# Patient Record
Sex: MALE | Race: WHITE | NOT HISPANIC OR LATINO | Employment: FULL TIME | URBAN - METROPOLITAN AREA
[De-identification: names, ages, dates, MRNs, and addresses within clinical notes are randomized per-mention and may not be internally consistent; named-entity substitution may affect disease eponyms.]

---

## 2023-05-08 ENCOUNTER — APPOINTMENT (OUTPATIENT)
Dept: PRIMARY CARE | Facility: CLINIC | Age: 38
End: 2023-05-08
Payer: COMMERCIAL

## 2023-05-12 ENCOUNTER — OFFICE VISIT (OUTPATIENT)
Dept: PRIMARY CARE | Facility: CLINIC | Age: 38
End: 2023-05-12
Payer: COMMERCIAL

## 2023-05-12 VITALS
BODY MASS INDEX: 31.72 KG/M2 | HEART RATE: 87 BPM | DIASTOLIC BLOOD PRESSURE: 66 MMHG | SYSTOLIC BLOOD PRESSURE: 116 MMHG | HEIGHT: 71 IN | WEIGHT: 226.6 LBS | OXYGEN SATURATION: 98 %

## 2023-05-12 DIAGNOSIS — Z13.6 ENCOUNTER FOR SCREENING FOR CARDIOVASCULAR DISORDERS: ICD-10-CM

## 2023-05-12 DIAGNOSIS — Z13.29 SCREENING FOR THYROID DISORDER: ICD-10-CM

## 2023-05-12 DIAGNOSIS — Z13.0 SCREENING FOR DEFICIENCY ANEMIA: ICD-10-CM

## 2023-05-12 DIAGNOSIS — Z00.00 ROUTINE GENERAL MEDICAL EXAMINATION AT A HEALTH CARE FACILITY: Primary | ICD-10-CM

## 2023-05-12 PROBLEM — R10.9 FLANK PAIN: Status: ACTIVE | Noted: 2023-05-12

## 2023-05-12 PROBLEM — L64.9 MALE PATTERN BALDNESS: Status: ACTIVE | Noted: 2023-05-12

## 2023-05-12 LAB
ALANINE AMINOTRANSFERASE (SGPT) (U/L) IN SER/PLAS: 39 U/L (ref 10–52)
ALBUMIN (G/DL) IN SER/PLAS: 4.7 G/DL (ref 3.4–5)
ALKALINE PHOSPHATASE (U/L) IN SER/PLAS: 56 U/L (ref 33–120)
ANION GAP IN SER/PLAS: 13 MMOL/L (ref 10–20)
ASPARTATE AMINOTRANSFERASE (SGOT) (U/L) IN SER/PLAS: 20 U/L (ref 9–39)
BILIRUBIN TOTAL (MG/DL) IN SER/PLAS: 0.5 MG/DL (ref 0–1.2)
CALCIUM (MG/DL) IN SER/PLAS: 9.9 MG/DL (ref 8.6–10.6)
CARBON DIOXIDE, TOTAL (MMOL/L) IN SER/PLAS: 28 MMOL/L (ref 21–32)
CHLORIDE (MMOL/L) IN SER/PLAS: 104 MMOL/L (ref 98–107)
CHOLESTEROL (MG/DL) IN SER/PLAS: 268 MG/DL (ref 0–199)
CHOLESTEROL IN HDL (MG/DL) IN SER/PLAS: 32.5 MG/DL
CHOLESTEROL/HDL RATIO: 8.2
CREATININE (MG/DL) IN SER/PLAS: 0.71 MG/DL (ref 0.5–1.3)
ERYTHROCYTE DISTRIBUTION WIDTH (RATIO) BY AUTOMATED COUNT: 12.3 % (ref 11.5–14.5)
ERYTHROCYTE MEAN CORPUSCULAR HEMOGLOBIN CONCENTRATION (G/DL) BY AUTOMATED: 32.7 G/DL (ref 32–36)
ERYTHROCYTE MEAN CORPUSCULAR VOLUME (FL) BY AUTOMATED COUNT: 94 FL (ref 80–100)
ERYTHROCYTES (10*6/UL) IN BLOOD BY AUTOMATED COUNT: 4.8 X10E12/L (ref 4.5–5.9)
ESTIMATED AVERAGE GLUCOSE FOR HBA1C: 80 MG/DL
GFR MALE: >90 ML/MIN/1.73M2
GLUCOSE (MG/DL) IN SER/PLAS: 85 MG/DL (ref 74–99)
HEMATOCRIT (%) IN BLOOD BY AUTOMATED COUNT: 44.9 % (ref 41–52)
HEMOGLOBIN (G/DL) IN BLOOD: 14.7 G/DL (ref 13.5–17.5)
HEMOGLOBIN A1C/HEMOGLOBIN TOTAL IN BLOOD: 4.4 %
LDL: 199 MG/DL (ref 0–99)
LEUKOCYTES (10*3/UL) IN BLOOD BY AUTOMATED COUNT: 4.7 X10E9/L (ref 4.4–11.3)
NRBC (PER 100 WBCS) BY AUTOMATED COUNT: 0 /100 WBC (ref 0–0)
PLATELETS (10*3/UL) IN BLOOD AUTOMATED COUNT: 285 X10E9/L (ref 150–450)
POTASSIUM (MMOL/L) IN SER/PLAS: 4.3 MMOL/L (ref 3.5–5.3)
PROTEIN TOTAL: 7 G/DL (ref 6.4–8.2)
SODIUM (MMOL/L) IN SER/PLAS: 141 MMOL/L (ref 136–145)
THYROTROPIN (MIU/L) IN SER/PLAS BY DETECTION LIMIT <= 0.05 MIU/L: 1.41 MIU/L (ref 0.44–3.98)
TRIGLYCERIDE (MG/DL) IN SER/PLAS: 185 MG/DL (ref 0–149)
UREA NITROGEN (MG/DL) IN SER/PLAS: 14 MG/DL (ref 6–23)
VLDL: 37 MG/DL (ref 0–40)

## 2023-05-12 PROCEDURE — 84443 ASSAY THYROID STIM HORMONE: CPT

## 2023-05-12 PROCEDURE — 83036 HEMOGLOBIN GLYCOSYLATED A1C: CPT

## 2023-05-12 PROCEDURE — 99395 PREV VISIT EST AGE 18-39: CPT | Performed by: STUDENT IN AN ORGANIZED HEALTH CARE EDUCATION/TRAINING PROGRAM

## 2023-05-12 PROCEDURE — 85027 COMPLETE CBC AUTOMATED: CPT

## 2023-05-12 PROCEDURE — 80061 LIPID PANEL: CPT

## 2023-05-12 PROCEDURE — 80053 COMPREHEN METABOLIC PANEL: CPT

## 2023-05-12 RX ORDER — ONDANSETRON 4 MG/1
4 TABLET, FILM COATED ORAL EVERY 8 HOURS PRN
COMMUNITY
Start: 2023-02-13 | End: 2023-05-12 | Stop reason: ALTCHOICE

## 2023-05-12 RX ORDER — FINASTERIDE 1 MG/1
1 TABLET, FILM COATED ORAL DAILY
COMMUNITY

## 2023-05-12 RX ORDER — AZITHROMYCIN 250 MG/1
250 TABLET, FILM COATED ORAL DAILY
COMMUNITY
Start: 2022-06-18 | End: 2023-05-12 | Stop reason: ALTCHOICE

## 2023-05-12 NOTE — PROGRESS NOTES
"Subjective   Patient ID: Palmer Bautista is a 37 y.o. male who presents for Annual Exam (He is fasting. Needs adoption forms filled out.).    HPI wellness visit.  No complaints    Review of Systems  Constitutional: NO F, chills, or sweats  Eyes: no blurred vision or visual disturbance  ENT: no hearing loss, no congestion, no nasal discharge, no hoarseness and no sore throat.   Cardiovascular: no chest pain, no edema, no palps and no syncope.   Respiratory: no cough,no s.o.b. and no wheezing  Gastrointestinal: no abdominal pain, No C/D no N/V, no blood in stools  Genitourinary: no dysuria, no change in urinary frequency, no urinary hesitancy and no feelings of urinary urgency.   Musculoskeletal: no arthralgias,  no back pain and no myalgias.   Integumentary: no new skin lesions and no rashes.   Neurological: no difficulty walking, no headache, no limb weakness, no numbness and no tingling.   Psychiatric: no anxiety, no depression, no anhedonia and no substance use disorders.   Endocrine: no recent weight gain and no recent weight loss.   Hematologic/Lymphatic: no tendency for easy bruising and no swollen glands.  Objective   /66 (BP Location: Right arm, Patient Position: Sitting, BP Cuff Size: Large adult)   Pulse 87   Ht 1.793 m (5' 10.59\")   Wt 103 kg (226 lb 9.6 oz)   SpO2 98%   BMI 31.97 kg/m²     Physical Exam  gen- a & o x 3, nad, pleasant  heent- eomi, perrla, ear canals patent, TM's non-erythematous, no fluid, frontal and maxillary sinus's nontender  neck- supple, nontender, no palpable or enlarged nodes, no thyromegaly  heart- rrr, no murmurs  lungs- cta b/l , no w/r/r  chest- symmetric, nontender  ab- soft, nontender, no palpable organomegaly, postive bowel sounds  ex's- no c/c/e  neuro- CNs 2-12 grossly intact, full sensation and strength in all extremities    Assessment/Plan     #1.  Well visit physical.  No concerns on exam.  No major complaints today.  Screening labs ordered today.    Forms " filled out for adoption agency.

## 2023-05-12 NOTE — PATIENT INSTRUCTIONS
#1.  Well visit physical.  No concerns on exam.  No major complaints today.  Screening labs ordered today.    Forms filled out for adoption agency.

## 2023-11-30 ENCOUNTER — OFFICE VISIT (OUTPATIENT)
Dept: CARDIOLOGY | Facility: CLINIC | Age: 38
End: 2023-11-30
Payer: COMMERCIAL

## 2023-11-30 VITALS
WEIGHT: 212.7 LBS | OXYGEN SATURATION: 97 % | BODY MASS INDEX: 29.78 KG/M2 | HEART RATE: 87 BPM | DIASTOLIC BLOOD PRESSURE: 82 MMHG | HEIGHT: 71 IN | SYSTOLIC BLOOD PRESSURE: 121 MMHG

## 2023-11-30 DIAGNOSIS — E78.00 HYPERCHOLESTEREMIA: Primary | ICD-10-CM

## 2023-11-30 PROCEDURE — 93005 ELECTROCARDIOGRAM TRACING: CPT | Performed by: INTERNAL MEDICINE

## 2023-11-30 PROCEDURE — 99214 OFFICE O/P EST MOD 30 MIN: CPT | Performed by: INTERNAL MEDICINE

## 2023-11-30 PROCEDURE — 99204 OFFICE O/P NEW MOD 45 MIN: CPT | Performed by: INTERNAL MEDICINE

## 2023-11-30 PROCEDURE — 1036F TOBACCO NON-USER: CPT | Performed by: INTERNAL MEDICINE

## 2023-11-30 RX ORDER — ATORVASTATIN CALCIUM 40 MG/1
40 TABLET, FILM COATED ORAL DAILY
Qty: 90 TABLET | Refills: 3 | Status: SHIPPED | OUTPATIENT
Start: 2023-11-30 | End: 2023-12-05 | Stop reason: SDUPTHER

## 2023-11-30 RX ORDER — ERGOCALCIFEROL 1.25 MG/1
1 CAPSULE ORAL
COMMUNITY
Start: 2023-10-03 | End: 2024-02-22 | Stop reason: WASHOUT

## 2023-11-30 ASSESSMENT — ENCOUNTER SYMPTOMS
RESPIRATORY NEGATIVE: 1
GASTROINTESTINAL NEGATIVE: 1
CARDIOVASCULAR NEGATIVE: 1
PSYCHIATRIC NEGATIVE: 1
HEMATOLOGIC/LYMPHATIC NEGATIVE: 1
ALLERGIC/IMMUNOLOGIC NEGATIVE: 1
EYES NEGATIVE: 1
CONSTITUTIONAL NEGATIVE: 1
NEUROLOGICAL NEGATIVE: 1
ENDOCRINE NEGATIVE: 1
MUSCULOSKELETAL NEGATIVE: 1

## 2023-11-30 ASSESSMENT — PAIN SCALES - GENERAL: PAINLEVEL: 0-NO PAIN

## 2023-11-30 NOTE — PROGRESS NOTES
Preventive Cardiology Clinic Note    Reason for Visit: Hypercholesterolemia  Referring Clinician: Kenneth     History of Present Illness: Palmer Bautista is a 38 y.o. male with hypercholesterolemia who is self-referred for cardiovascular risk discussion.  He is an oncologist hematologist here at  and is generally healthy without any chronic cardiac conditions.  He has noted elevated cholesterol over several years and despite aggressive dietary changes and weight loss his LDL cholesterol remains approximately 200 mg/dL.  He does have parents and siblings with high cholesterol but there is no premature CAD in the family and no history of familial hypercholesterolemia.  He is a non-smoker.  He does not have diabetes or hypertension.  He is active but does not exercise regularly as he just adopted a 1-year-old and is busy at home with childrearing.    Past Medical History:  Hypercholesterolemia    Past Surgical History:  He has no past surgical history on file.    Social History:  Non-smoker    Family History:  No premature CAD in the family.    Allergies:  Penicillins    Outpatient Medications:  Current Outpatient Medications   Medication Instructions    atorvastatin (LIPITOR) 40 mg, oral, Daily    ergocalciferol (Vitamin D-2) 1.25 MG (18751 UT) capsule 1 capsule, oral, Weekly    finasteride (PROPECIA) 1 mg, oral, Daily       Review of Systems:  Review of Systems   Constitutional: Negative.   HENT: Negative.     Eyes: Negative.    Cardiovascular: Negative.    Respiratory: Negative.     Endocrine: Negative.    Hematologic/Lymphatic: Negative.    Skin: Negative.    Musculoskeletal: Negative.    Gastrointestinal: Negative.    Genitourinary: Negative.    Neurological: Negative.    Psychiatric/Behavioral: Negative.     Allergic/Immunologic: Negative.        Last Recorded Vitals:  Vitals:    11/30/23 0908   BP: 121/82   BP Location: Left arm   Patient Position: Sitting   Pulse: 87   SpO2: 97%   Weight: 96.5 kg (212 lb 11.2  "oz)   Height: 1.803 m (5' 11\")       Physical Examination:  General: Well appearing, well-nourished, in no acute distress.  HEENT: Normocephalic atraumatic, pupils equal and reactive to light, extraocular muscles intact, no conjunctival injection, oropharynx clear without exudates.  Neck: Normal carotid arterial pulses, no arterial bruits, no thyromegaly.  Cardiac: Regular rhythm and normal heart rate.  S1, S2 present and normal.  No murmurs, rubs, or gallops.  PMI is nondisplaced. Jugular venous pulsations are normal.  Pulmonary: Normal breath sounds, no increased work of breathing, no wheezes or crackles.  GI: Normal bowel sounds, abdominal aorta not enlarged, no hepatosplenomegaly, no abdominal bruits.  Lower extremities: No cyanosis, clubbing, or edema.  No xanthelasma present. Normal distal pulses.  Skin: Skin intact. No significant rashes or lesions present.  Neuro: Alert and oriented x 3, normal attention and cognition, no focal motor or sensory neurologic deficits.  Psych: Normal affect and mood.  Musculoskeletal: Normal gait normal muscle tone.    Laboratory Studies:  Lab Results   Component Value Date    GLUCOSE 85 05/12/2023    CALCIUM 9.9 05/12/2023     05/12/2023    K 4.3 05/12/2023    CO2 28 05/12/2023     05/12/2023    BUN 14 05/12/2023    CREATININE 0.71 05/12/2023     Lab Results   Component Value Date    ALT 39 05/12/2023    AST 20 05/12/2023    ALKPHOS 56 05/12/2023    BILITOT 0.5 05/12/2023         Lab Results   Component Value Date    CHOL 268 (H) 05/12/2023    CHOL 224 (H) 08/17/2021     Lab Results   Component Value Date    HDL 32.5 (A) 05/12/2023    HDL 41.0 08/17/2021     No results found for: \"LDLCALC\"  Lab Results   Component Value Date    TRIG 185 (H) 05/12/2023    TRIG 178 (H) 08/17/2021     No components found for: \"CHOLHDL\"  Lab Results   Component Value Date    HGBA1C 4.4 05/12/2023     No components found for: \"UACR\"    Cardiology Tests:  ECG:  Normal sinus rhythm, " ventricular rate 80 bpm, normal intervals and axis, normal ECG.      Assessment and Plan:  Problem List Items Addressed This Visit    None  Visit Diagnoses       Hypercholesteremia    -  Primary    Relevant Medications    atorvastatin (Lipitor) 40 mg tablet    Other Relevant Orders    ECG 12 Lead    Lipid Panel    CT cardiac scoring wo IV contrast          Palmer Bautista is a 38 y.o. male with hypercholesterolemia who is self-referred for cardiovascular risk discussion.  He is at overall low 10-year ASCVD risk at 3.3% although he does have severe hypercholesterolemia.  It is unlikely that he has a familial hypercholesterolemia monogenic mutation as his lipid panel is most suggestive of a mixed dyslipidemia and polygenic inheritance pattern.  Although his overall 10-year risk is low, his lifetime risk is elevated due to the severe hypercholesterolemia and he also has a class I indication for statin therapy due to LDL cholesterol greater than 190 mg/dL on multiple draws.  Therefore recommended initiation of a high intensity moderate dose statin such as atorvastatin 40 mg which can be expected to reduce his LDL cholesterol by approximately 50%.  I also discussed getting a coronary artery calcium score for further risk assessment given his severe hypercholesterolemia and he will complete this at his earliest convenience.  I will see him again in 3 months here in the office with a repeat lipid panel to go over the results and further recommendations.  Please do not hesitate to call or contact me with questions or concerns.    Stephon Cooper MD, FAJOSELIN, FACC  Director,  [facility]  Director,  [facility] Program  Associate Professor of Medicine  [employer]

## 2023-12-04 ENCOUNTER — PATIENT MESSAGE (OUTPATIENT)
Dept: CARDIOLOGY | Facility: CLINIC | Age: 38
End: 2023-12-04
Payer: COMMERCIAL

## 2023-12-04 DIAGNOSIS — E78.00 HYPERCHOLESTEREMIA: ICD-10-CM

## 2023-12-05 RX ORDER — ATORVASTATIN CALCIUM 40 MG/1
40 TABLET, FILM COATED ORAL DAILY
Qty: 90 TABLET | Refills: 3 | Status: SHIPPED | OUTPATIENT
Start: 2023-12-05 | End: 2024-12-04

## 2023-12-11 LAB
ATRIAL RATE: 80 BPM
P AXIS: 10 DEGREES
P OFFSET: 216 MS
P ONSET: 165 MS
PR INTERVAL: 114 MS
Q ONSET: 222 MS
QRS COUNT: 13 BEATS
QRS DURATION: 92 MS
QT INTERVAL: 362 MS
QTC CALCULATION(BAZETT): 417 MS
QTC FREDERICIA: 398 MS
R AXIS: 10 DEGREES
T AXIS: 19 DEGREES
T OFFSET: 403 MS
VENTRICULAR RATE: 80 BPM

## 2023-12-15 ENCOUNTER — APPOINTMENT (OUTPATIENT)
Dept: RADIOLOGY | Facility: CLINIC | Age: 38
End: 2023-12-15
Payer: COMMERCIAL

## 2024-01-02 ENCOUNTER — ANCILLARY PROCEDURE (OUTPATIENT)
Dept: RADIOLOGY | Facility: CLINIC | Age: 39
End: 2024-01-02
Payer: COMMERCIAL

## 2024-01-02 DIAGNOSIS — E78.00 HYPERCHOLESTEREMIA: ICD-10-CM

## 2024-01-02 PROCEDURE — 75571 CT HRT W/O DYE W/CA TEST: CPT

## 2024-02-22 ENCOUNTER — LAB (OUTPATIENT)
Dept: LAB | Facility: LAB | Age: 39
End: 2024-02-22
Payer: COMMERCIAL

## 2024-02-22 ENCOUNTER — OFFICE VISIT (OUTPATIENT)
Dept: CARDIOLOGY | Facility: CLINIC | Age: 39
End: 2024-02-22
Payer: COMMERCIAL

## 2024-02-22 VITALS
HEART RATE: 100 BPM | HEIGHT: 71 IN | BODY MASS INDEX: 30.52 KG/M2 | DIASTOLIC BLOOD PRESSURE: 78 MMHG | SYSTOLIC BLOOD PRESSURE: 112 MMHG | OXYGEN SATURATION: 96 % | WEIGHT: 218 LBS

## 2024-02-22 DIAGNOSIS — E78.00 HYPERCHOLESTEREMIA: Primary | ICD-10-CM

## 2024-02-22 DIAGNOSIS — E78.00 HYPERCHOLESTEREMIA: ICD-10-CM

## 2024-02-22 LAB
ALBUMIN SERPL BCP-MCNC: 4.9 G/DL (ref 3.4–5)
ALP SERPL-CCNC: 53 U/L (ref 33–120)
ALT SERPL W P-5'-P-CCNC: 43 U/L (ref 10–52)
AST SERPL W P-5'-P-CCNC: 21 U/L (ref 9–39)
BILIRUB DIRECT SERPL-MCNC: 0.1 MG/DL (ref 0–0.3)
BILIRUB SERPL-MCNC: 0.6 MG/DL (ref 0–1.2)
CHOLEST SERPL-MCNC: 162 MG/DL (ref 0–199)
CHOLESTEROL/HDL RATIO: 3.9
HDLC SERPL-MCNC: 41.1 MG/DL
LDLC SERPL CALC-MCNC: 100 MG/DL
NON HDL CHOLESTEROL: 121 MG/DL (ref 0–149)
PROT SERPL-MCNC: 7.3 G/DL (ref 6.4–8.2)
TRIGL SERPL-MCNC: 103 MG/DL (ref 0–149)
VLDL: 21 MG/DL (ref 0–40)

## 2024-02-22 PROCEDURE — 99214 OFFICE O/P EST MOD 30 MIN: CPT | Performed by: INTERNAL MEDICINE

## 2024-02-22 PROCEDURE — 36415 COLL VENOUS BLD VENIPUNCTURE: CPT

## 2024-02-22 PROCEDURE — 80061 LIPID PANEL: CPT

## 2024-02-22 PROCEDURE — 1036F TOBACCO NON-USER: CPT | Performed by: INTERNAL MEDICINE

## 2024-02-22 PROCEDURE — 80076 HEPATIC FUNCTION PANEL: CPT

## 2024-02-22 ASSESSMENT — ENCOUNTER SYMPTOMS
RESPIRATORY NEGATIVE: 1
HEMATOLOGIC/LYMPHATIC NEGATIVE: 1
CONSTITUTIONAL NEGATIVE: 1
NEUROLOGICAL NEGATIVE: 1
EYES NEGATIVE: 1
LOSS OF SENSATION IN FEET: 0
MUSCULOSKELETAL NEGATIVE: 1
ALLERGIC/IMMUNOLOGIC NEGATIVE: 1
GASTROINTESTINAL NEGATIVE: 1
ENDOCRINE NEGATIVE: 1
DEPRESSION: 0
CARDIOVASCULAR NEGATIVE: 1
PSYCHIATRIC NEGATIVE: 1
OCCASIONAL FEELINGS OF UNSTEADINESS: 0

## 2024-02-22 ASSESSMENT — PAIN SCALES - GENERAL: PAINLEVEL: 0-NO PAIN

## 2024-02-22 NOTE — PROGRESS NOTES
Preventive Cardiology Clinic Note    Reason for Visit: Follow-up.  Referring Clinician: No ref. provider found     History of Present Illness: Palmer Bautista is a 38 y.o. male with hypercholesterolemia who is self-referred for cardiovascular risk discussion.  He is at overall low 10-year ASCVD risk at 3.3% although he does have severe hypercholesterolemia.  Since his last visit he performed a calcium score which was normal at 0 and although he was low risk given the long-term lifetime risk of ASCVD was elevated from severe hypercholesterolemia we decided to start a statin.  He is tolerating it well without any adverse effects.  He does not have any exertional chest pain, shortness of breath, palpitations, or syncope.  He will be getting a repeat lipid panel today.    Past Medical History:  Severe hypercholesterolemia    Past Surgical History:  He has no past surgical history on file.    Social History:  He reports that he has never smoked. He has never used smokeless tobacco. He reports current alcohol use of about 2.0 standard drinks of alcohol per week. He reports that he does not currently use drugs.    Family History:  No family history on file.    Allergies:  Penicillins    Outpatient Medications:  Current Outpatient Medications   Medication Instructions    atorvastatin (LIPITOR) 40 mg, oral, Daily    finasteride (PROPECIA) 1 mg, oral, Daily       Review of Systems:  Review of Systems   Constitutional: Negative.   HENT: Negative.     Eyes: Negative.    Cardiovascular: Negative.    Respiratory: Negative.     Endocrine: Negative.    Hematologic/Lymphatic: Negative.    Skin: Negative.    Musculoskeletal: Negative.    Gastrointestinal: Negative.    Genitourinary: Negative.    Neurological: Negative.    Psychiatric/Behavioral: Negative.     Allergic/Immunologic: Negative.        Last Recorded Vitals:  Vitals:    02/22/24 0809   BP: 112/78   BP Location: Left arm   Patient Position: Sitting   Pulse: 100   SpO2: 96%  "  Weight: 98.9 kg (218 lb)   Height: 1.803 m (5' 11\")       Physical Examination:  General: Well appearing, well-nourished, in no acute distress.  HEENT: Normocephalic atraumatic, pupils equal and reactive to light, extraocular muscles intact, no conjunctival injection, oropharynx clear without exudates.  Neck: Normal carotid arterial pulses, no arterial bruits, no thyromegaly.  Cardiac: Regular rhythm and normal heart rate.  S1, S2 present and normal.  No murmurs, rubs, or gallops.  PMI is nondisplaced. Jugular venous pulsations are normal.  Pulmonary: Normal breath sounds, no increased work of breathing, no wheezes or crackles.  GI: Normal bowel sounds, abdominal aorta not enlarged, no hepatosplenomegaly, no abdominal bruits.  Lower extremities: No cyanosis, clubbing, or edema.  No xanthelasma present. Normal distal pulses.  Skin: Skin intact. No significant rashes or lesions present.  Neuro: Alert and oriented x 3, normal attention and cognition, no focal motor or sensory neurologic deficits.  Psych: Normal affect and mood.  Musculoskeletal: Normal gait normal muscle tone.    Laboratory Studies:  Lab Results   Component Value Date    GLUCOSE 85 05/12/2023    CALCIUM 9.9 05/12/2023     05/12/2023    K 4.3 05/12/2023    CO2 28 05/12/2023     05/12/2023    BUN 14 05/12/2023    CREATININE 0.71 05/12/2023     Lab Results   Component Value Date    ALT 39 05/12/2023    AST 20 05/12/2023    ALKPHOS 56 05/12/2023    BILITOT 0.5 05/12/2023         Lab Results   Component Value Date    CHOL 268 (H) 05/12/2023    CHOL 224 (H) 08/17/2021     Lab Results   Component Value Date    HDL 32.5 (A) 05/12/2023    HDL 41.0 08/17/2021     No results found for: \"LDLCALC\"  Lab Results   Component Value Date    TRIG 185 (H) 05/12/2023    TRIG 178 (H) 08/17/2021     No components found for: \"CHOLHDL\"  Lab Results   Component Value Date    HGBA1C 4.4 05/12/2023     No components found for: \"UACR\"    Cardiology Tests:  Cardiac " Imaging:  CT cardiac scoring wo IV contrast 01/02/2024  LM 0,  LAD 0,  LCx 0,  RCA 0,  Total 0    Assessment and Plan:  Problem List Items Addressed This Visit    None  Visit Diagnoses       Hypercholesteremia    -  Primary    Relevant Orders    Lipid Panel    Hepatic function panel          Palmer Bautista is a 38 y.o. male with hypercholesterolemia who is self-referred for cardiovascular risk discussion.  He is at overall low 10-year ASCVD risk at 3.3% although he does have severe hypercholesterolemia.  Since his last visit he performed a calcium score which was normal at 0 and although he was low risk given the long-term lifetime risk of ASCVD was elevated from severe hypercholesterolemia we decided to start a statin.  He is tolerating it well without any adverse effects.  He will be getting a repeat lipid panel today.  Goal LDL cholesterol is less than 100 mg/dL which I believe he will be able to achieve.  I will also check a hepatic function panel to ensure no elevation in LFTs.  Once we get the LDL goal he will continue the medication long-term for ASCVD risk reduction.  I will see him again in 1 year here in the office routine follow-up.  Please do not hesitate to call or contact me with questions or concerns.    Stephon Cooper MD, LUIS M, FACC  Director,  [facility]  Director,  [facility] Program  Associate Professor of Medicine  [employer]

## 2024-10-27 DIAGNOSIS — E78.00 HYPERCHOLESTEREMIA: ICD-10-CM

## 2024-10-28 RX ORDER — ATORVASTATIN CALCIUM 40 MG/1
40 TABLET, FILM COATED ORAL DAILY
Qty: 90 TABLET | Refills: 3 | Status: SHIPPED | OUTPATIENT
Start: 2024-10-28

## 2025-02-27 ENCOUNTER — APPOINTMENT (OUTPATIENT)
Dept: CARDIOLOGY | Facility: CLINIC | Age: 40
End: 2025-02-27
Payer: COMMERCIAL